# Patient Record
Sex: FEMALE | Race: WHITE | Employment: UNEMPLOYED | URBAN - METROPOLITAN AREA
[De-identification: names, ages, dates, MRNs, and addresses within clinical notes are randomized per-mention and may not be internally consistent; named-entity substitution may affect disease eponyms.]

---

## 2017-01-18 ENCOUNTER — OFFICE VISIT (OUTPATIENT)
Dept: ORTHOPEDIC SURGERY | Age: 57
End: 2017-01-18

## 2017-01-18 VITALS
HEART RATE: 98 BPM | DIASTOLIC BLOOD PRESSURE: 88 MMHG | BODY MASS INDEX: 25.66 KG/M2 | SYSTOLIC BLOOD PRESSURE: 135 MMHG | WEIGHT: 154 LBS | HEIGHT: 65 IN

## 2017-01-18 DIAGNOSIS — M75.81 ROTATOR CUFF TENDINITIS, RIGHT: ICD-10-CM

## 2017-01-18 DIAGNOSIS — M54.2 NECK PAIN: Primary | ICD-10-CM

## 2017-01-18 DIAGNOSIS — S16.1XXA CERVICAL STRAIN, INITIAL ENCOUNTER: ICD-10-CM

## 2017-01-18 DIAGNOSIS — S46.911A SHOULDER STRAIN, RIGHT, INITIAL ENCOUNTER: ICD-10-CM

## 2017-01-18 PROCEDURE — 99243 OFF/OP CNSLTJ NEW/EST LOW 30: CPT | Performed by: PHYSICAL MEDICINE & REHABILITATION

## 2017-01-18 PROCEDURE — 72050 X-RAY EXAM NECK SPINE 4/5VWS: CPT | Performed by: PHYSICAL MEDICINE & REHABILITATION

## 2017-01-25 ENCOUNTER — TELEPHONE (OUTPATIENT)
Dept: ORTHOPEDIC SURGERY | Age: 57
End: 2017-01-25

## 2017-01-31 ENCOUNTER — OFFICE VISIT (OUTPATIENT)
Dept: ORTHOPEDIC SURGERY | Age: 57
End: 2017-01-31

## 2017-01-31 DIAGNOSIS — S46.911D STRAIN OF RIGHT SHOULDER, SUBSEQUENT ENCOUNTER: Primary | ICD-10-CM

## 2017-01-31 PROCEDURE — 95886 MUSC TEST DONE W/N TEST COMP: CPT | Performed by: PHYSICAL MEDICINE & REHABILITATION

## 2017-01-31 PROCEDURE — 95908 NRV CNDJ TST 3-4 STUDIES: CPT | Performed by: PHYSICAL MEDICINE & REHABILITATION

## 2017-02-24 ENCOUNTER — TELEPHONE (OUTPATIENT)
Dept: ORTHOPEDIC SURGERY | Age: 57
End: 2017-02-24

## 2017-03-01 ENCOUNTER — OFFICE VISIT (OUTPATIENT)
Dept: ORTHOPEDIC SURGERY | Age: 57
End: 2017-03-01

## 2017-03-01 VITALS
HEIGHT: 65 IN | DIASTOLIC BLOOD PRESSURE: 86 MMHG | BODY MASS INDEX: 25.67 KG/M2 | HEART RATE: 101 BPM | SYSTOLIC BLOOD PRESSURE: 132 MMHG | WEIGHT: 154.1 LBS

## 2017-03-01 DIAGNOSIS — M75.81 ROTATOR CUFF TENDINITIS, RIGHT: ICD-10-CM

## 2017-03-01 DIAGNOSIS — S46.911A SHOULDER STRAIN, RIGHT, INITIAL ENCOUNTER: Primary | ICD-10-CM

## 2017-03-01 PROCEDURE — 99212 OFFICE O/P EST SF 10 MIN: CPT | Performed by: PHYSICAL MEDICINE & REHABILITATION

## 2017-03-14 ENCOUNTER — TELEPHONE (OUTPATIENT)
Dept: ORTHOPEDIC SURGERY | Age: 57
End: 2017-03-14

## 2017-03-15 ENCOUNTER — TELEPHONE (OUTPATIENT)
Dept: ORTHOPEDIC SURGERY | Age: 57
End: 2017-03-15

## 2017-03-30 ENCOUNTER — TELEPHONE (OUTPATIENT)
Dept: ORTHOPEDIC SURGERY | Age: 57
End: 2017-03-30

## 2017-04-04 ENCOUNTER — TELEPHONE (OUTPATIENT)
Dept: ORTHOPEDIC SURGERY | Age: 57
End: 2017-04-04

## 2017-04-05 ENCOUNTER — TELEPHONE (OUTPATIENT)
Dept: ORTHOPEDIC SURGERY | Age: 57
End: 2017-04-05

## 2022-03-09 ENCOUNTER — HOSPITAL ENCOUNTER (OUTPATIENT)
Dept: GENERAL RADIOLOGY | Age: 62
Discharge: HOME | End: 2022-03-09
Payer: COMMERCIAL

## 2022-03-09 ENCOUNTER — HOSPITAL ENCOUNTER (OUTPATIENT)
Age: 62
Discharge: HOME | End: 2022-03-09
Payer: COMMERCIAL

## 2022-03-09 DIAGNOSIS — M54.16 LUMBAR RADICULAR PAIN: ICD-10-CM

## 2022-03-09 DIAGNOSIS — M47.816 SPONDYLOSIS OF LUMBAR REGION WITHOUT MYELOPATHY OR RADICULOPATHY: ICD-10-CM

## 2022-03-09 PROCEDURE — 72070 X-RAY EXAM THORAC SPINE 2VWS: CPT

## 2022-03-09 PROCEDURE — 72110 X-RAY EXAM L-2 SPINE 4/>VWS: CPT

## 2023-06-03 ENCOUNTER — HOSPITAL ENCOUNTER (INPATIENT)
Age: 63
LOS: 3 days | Discharge: HOME | DRG: 198 | End: 2023-06-06
Attending: EMERGENCY MEDICINE | Admitting: INTERNAL MEDICINE
Payer: COMMERCIAL

## 2023-06-03 ENCOUNTER — APPOINTMENT (OUTPATIENT)
Dept: GENERAL RADIOLOGY | Age: 63
DRG: 198 | End: 2023-06-03
Payer: COMMERCIAL

## 2023-06-03 DIAGNOSIS — R06.02 SHORTNESS OF BREATH: ICD-10-CM

## 2023-06-03 DIAGNOSIS — R06.09 EXERTIONAL DYSPNEA: Primary | ICD-10-CM

## 2023-06-03 DIAGNOSIS — R07.9 CHEST PAIN, UNSPECIFIED TYPE: ICD-10-CM

## 2023-06-03 DIAGNOSIS — R06.01 ORTHOPNEA: ICD-10-CM

## 2023-06-03 DIAGNOSIS — J44.9 CHRONIC OBSTRUCTIVE PULMONARY DISEASE, UNSPECIFIED COPD TYPE (HCC): ICD-10-CM

## 2023-06-03 DIAGNOSIS — R60.0 LEG EDEMA: ICD-10-CM

## 2023-06-03 PROBLEM — F17.210 CIGARETTE NICOTINE DEPENDENCE WITHOUT COMPLICATION: Status: ACTIVE | Noted: 2023-06-03

## 2023-06-03 LAB
ALBUMIN SERPL-MCNC: 4.3 G/DL (ref 3.4–5)
ALBUMIN/GLOB SERPL: 1.6 {RATIO} (ref 1.1–2.2)
ALP SERPL-CCNC: 78 U/L (ref 40–129)
ALT SERPL-CCNC: 17 U/L (ref 10–40)
ANION GAP SERPL CALCULATED.3IONS-SCNC: 13 MMOL/L (ref 3–16)
AST SERPL-CCNC: 20 U/L (ref 15–37)
BASE EXCESS BLDV CALC-SCNC: 0.1 MMOL/L (ref -3–3)
BASOPHILS # BLD: 0.1 K/UL (ref 0–0.2)
BASOPHILS NFR BLD: 0.6 %
BILIRUB SERPL-MCNC: <0.2 MG/DL (ref 0–1)
BILIRUB UR QL STRIP.AUTO: NEGATIVE
BUN SERPL-MCNC: 17 MG/DL (ref 7–20)
CALCIUM SERPL-MCNC: 9.8 MG/DL (ref 8.3–10.6)
CHLORIDE SERPL-SCNC: 101 MMOL/L (ref 99–110)
CLARITY UR: CLEAR
CO2 BLDV-SCNC: 28 MMOL/L
CO2 SERPL-SCNC: 26 MMOL/L (ref 21–32)
COHGB MFR BLDV: 4.6 % (ref 0–1.5)
COLOR UR: YELLOW
CREAT SERPL-MCNC: 0.6 MG/DL (ref 0.6–1.2)
D DIMER: 0.36 UG/ML FEU (ref 0–0.6)
DEPRECATED RDW RBC AUTO: 13.6 % (ref 12.4–15.4)
EKG ATRIAL RATE: 103 BPM
EKG DIAGNOSIS: NORMAL
EKG P AXIS: 86 DEGREES
EKG P-R INTERVAL: 116 MS
EKG Q-T INTERVAL: 354 MS
EKG QRS DURATION: 82 MS
EKG QTC CALCULATION (BAZETT): 463 MS
EKG R AXIS: 80 DEGREES
EKG T AXIS: 66 DEGREES
EKG VENTRICULAR RATE: 103 BPM
EOSINOPHIL # BLD: 0.1 K/UL (ref 0–0.6)
EOSINOPHIL NFR BLD: 0.8 %
EPI CELLS #/AREA URNS HPF: NORMAL /HPF (ref 0–5)
GFR SERPLBLD CREATININE-BSD FMLA CKD-EPI: >60 ML/MIN/{1.73_M2}
GLUCOSE SERPL-MCNC: 114 MG/DL (ref 70–99)
GLUCOSE UR STRIP.AUTO-MCNC: NEGATIVE MG/DL
HCO3 BLDV-SCNC: 26.4 MMOL/L (ref 23–29)
HCT VFR BLD AUTO: 40.1 % (ref 36–48)
HGB BLD-MCNC: 13.5 G/DL (ref 12–16)
HGB UR QL STRIP.AUTO: ABNORMAL
KETONES UR STRIP.AUTO-MCNC: NEGATIVE MG/DL
LACTATE BLDV-SCNC: 1.8 MMOL/L (ref 0.4–1.9)
LACTATE BLDV-SCNC: 2.1 MMOL/L (ref 0.4–1.9)
LEUKOCYTE ESTERASE UR QL STRIP.AUTO: ABNORMAL
LYMPHOCYTES # BLD: 2.2 K/UL (ref 1–5.1)
LYMPHOCYTES NFR BLD: 22.7 %
MCH RBC QN AUTO: 30.3 PG (ref 26–34)
MCHC RBC AUTO-ENTMCNC: 33.8 G/DL (ref 31–36)
MCV RBC AUTO: 89.8 FL (ref 80–100)
METHGB MFR BLDV: 0.3 %
MONOCYTES # BLD: 0.6 K/UL (ref 0–1.3)
MONOCYTES NFR BLD: 6.1 %
NEUTROPHILS # BLD: 6.9 K/UL (ref 1.7–7.7)
NEUTROPHILS NFR BLD: 69.8 %
NITRITE UR QL STRIP.AUTO: NEGATIVE
NT-PROBNP SERPL-MCNC: 185 PG/ML (ref 0–124)
O2 THERAPY: ABNORMAL
PCO2 BLDV: 49.2 MMHG (ref 40–50)
PH BLDV: 7.35 [PH] (ref 7.35–7.45)
PH UR STRIP.AUTO: 6 [PH] (ref 5–8)
PLATELET # BLD AUTO: 259 K/UL (ref 135–450)
PMV BLD AUTO: 8.9 FL (ref 5–10.5)
PO2 BLDV: 48.9 MMHG (ref 25–40)
POTASSIUM SERPL-SCNC: 3.9 MMOL/L (ref 3.5–5.1)
PROCALCITONIN SERPL IA-MCNC: 0.03 NG/ML (ref 0–0.15)
PROT SERPL-MCNC: 7 G/DL (ref 6.4–8.2)
PROT UR STRIP.AUTO-MCNC: NEGATIVE MG/DL
RBC # BLD AUTO: 4.46 M/UL (ref 4–5.2)
RBC #/AREA URNS HPF: NORMAL /HPF (ref 0–4)
SAO2 % BLDV: 82 %
SODIUM SERPL-SCNC: 140 MMOL/L (ref 136–145)
SP GR UR STRIP.AUTO: 1.01 (ref 1–1.03)
TROPONIN, HIGH SENSITIVITY: 18 NG/L (ref 0–14)
TROPONIN, HIGH SENSITIVITY: 19 NG/L (ref 0–14)
TROPONIN, HIGH SENSITIVITY: 19 NG/L (ref 0–14)
UA COMPLETE W REFLEX CULTURE PNL UR: ABNORMAL
UA DIPSTICK W REFLEX MICRO PNL UR: YES
URN SPEC COLLECT METH UR: ABNORMAL
UROBILINOGEN UR STRIP-ACNC: 0.2 E.U./DL
WBC # BLD AUTO: 9.9 K/UL (ref 4–11)
WBC #/AREA URNS HPF: NORMAL /HPF (ref 0–5)

## 2023-06-03 PROCEDURE — 84145 PROCALCITONIN (PCT): CPT

## 2023-06-03 PROCEDURE — 96374 THER/PROPH/DIAG INJ IV PUSH: CPT

## 2023-06-03 PROCEDURE — 99285 EMERGENCY DEPT VISIT HI MDM: CPT

## 2023-06-03 PROCEDURE — 99223 1ST HOSP IP/OBS HIGH 75: CPT | Performed by: INTERNAL MEDICINE

## 2023-06-03 PROCEDURE — 2500000003 HC RX 250 WO HCPCS: Performed by: NURSE PRACTITIONER

## 2023-06-03 PROCEDURE — 85379 FIBRIN DEGRADATION QUANT: CPT

## 2023-06-03 PROCEDURE — 83880 ASSAY OF NATRIURETIC PEPTIDE: CPT

## 2023-06-03 PROCEDURE — 6360000002 HC RX W HCPCS: Performed by: EMERGENCY MEDICINE

## 2023-06-03 PROCEDURE — 93005 ELECTROCARDIOGRAM TRACING: CPT | Performed by: NURSE PRACTITIONER

## 2023-06-03 PROCEDURE — 93010 ELECTROCARDIOGRAM REPORT: CPT | Performed by: INTERNAL MEDICINE

## 2023-06-03 PROCEDURE — 6370000000 HC RX 637 (ALT 250 FOR IP): Performed by: EMERGENCY MEDICINE

## 2023-06-03 PROCEDURE — 85025 COMPLETE CBC W/AUTO DIFF WBC: CPT

## 2023-06-03 PROCEDURE — A4216 STERILE WATER/SALINE, 10 ML: HCPCS | Performed by: NURSE PRACTITIONER

## 2023-06-03 PROCEDURE — 87040 BLOOD CULTURE FOR BACTERIA: CPT

## 2023-06-03 PROCEDURE — 2580000003 HC RX 258: Performed by: INTERNAL MEDICINE

## 2023-06-03 PROCEDURE — 82803 BLOOD GASES ANY COMBINATION: CPT

## 2023-06-03 PROCEDURE — 71046 X-RAY EXAM CHEST 2 VIEWS: CPT

## 2023-06-03 PROCEDURE — 2060000000 HC ICU INTERMEDIATE R&B

## 2023-06-03 PROCEDURE — 6370000000 HC RX 637 (ALT 250 FOR IP): Performed by: NURSE PRACTITIONER

## 2023-06-03 PROCEDURE — 36415 COLL VENOUS BLD VENIPUNCTURE: CPT

## 2023-06-03 PROCEDURE — 81001 URINALYSIS AUTO W/SCOPE: CPT

## 2023-06-03 PROCEDURE — 6370000000 HC RX 637 (ALT 250 FOR IP): Performed by: INTERNAL MEDICINE

## 2023-06-03 PROCEDURE — 83605 ASSAY OF LACTIC ACID: CPT

## 2023-06-03 PROCEDURE — 2580000003 HC RX 258: Performed by: NURSE PRACTITIONER

## 2023-06-03 PROCEDURE — 80053 COMPREHEN METABOLIC PANEL: CPT

## 2023-06-03 PROCEDURE — 84484 ASSAY OF TROPONIN QUANT: CPT

## 2023-06-03 RX ORDER — SODIUM CHLORIDE 9 MG/ML
INJECTION, SOLUTION INTRAVENOUS PRN
Status: DISCONTINUED | OUTPATIENT
Start: 2023-06-03 | End: 2023-06-06 | Stop reason: HOSPADM

## 2023-06-03 RX ORDER — ASPIRIN 81 MG/1
81 TABLET, CHEWABLE ORAL DAILY
Status: DISCONTINUED | OUTPATIENT
Start: 2023-06-04 | End: 2023-06-06 | Stop reason: HOSPADM

## 2023-06-03 RX ORDER — ENOXAPARIN SODIUM 100 MG/ML
40 INJECTION SUBCUTANEOUS DAILY
Status: DISCONTINUED | OUTPATIENT
Start: 2023-06-03 | End: 2023-06-06 | Stop reason: HOSPADM

## 2023-06-03 RX ORDER — PREGABALIN 100 MG/1
100 CAPSULE ORAL 2 TIMES DAILY
Status: DISCONTINUED | OUTPATIENT
Start: 2023-06-03 | End: 2023-06-06 | Stop reason: HOSPADM

## 2023-06-03 RX ORDER — ACETAMINOPHEN 650 MG/1
650 SUPPOSITORY RECTAL EVERY 6 HOURS PRN
Status: DISCONTINUED | OUTPATIENT
Start: 2023-06-03 | End: 2023-06-06 | Stop reason: HOSPADM

## 2023-06-03 RX ORDER — NICOTINE 21 MG/24HR
1 PATCH, TRANSDERMAL 24 HOURS TRANSDERMAL DAILY
Status: DISCONTINUED | OUTPATIENT
Start: 2023-06-03 | End: 2023-06-06 | Stop reason: HOSPADM

## 2023-06-03 RX ORDER — SODIUM CHLORIDE 0.9 % (FLUSH) 0.9 %
5-40 SYRINGE (ML) INJECTION PRN
Status: DISCONTINUED | OUTPATIENT
Start: 2023-06-03 | End: 2023-06-06 | Stop reason: HOSPADM

## 2023-06-03 RX ORDER — ASPIRIN 325 MG
325 TABLET ORAL ONCE
Status: COMPLETED | OUTPATIENT
Start: 2023-06-03 | End: 2023-06-03

## 2023-06-03 RX ORDER — HYDROCODONE BITARTRATE AND ACETAMINOPHEN 5; 325 MG/1; MG/1
1 TABLET ORAL EVERY 6 HOURS PRN
Status: DISCONTINUED | OUTPATIENT
Start: 2023-06-03 | End: 2023-06-06 | Stop reason: HOSPADM

## 2023-06-03 RX ORDER — 0.9 % SODIUM CHLORIDE 0.9 %
30 INTRAVENOUS SOLUTION INTRAVENOUS ONCE
Status: COMPLETED | OUTPATIENT
Start: 2023-06-03 | End: 2023-06-03

## 2023-06-03 RX ORDER — ONDANSETRON 2 MG/ML
4 INJECTION INTRAMUSCULAR; INTRAVENOUS EVERY 6 HOURS PRN
Status: DISCONTINUED | OUTPATIENT
Start: 2023-06-03 | End: 2023-06-06 | Stop reason: HOSPADM

## 2023-06-03 RX ORDER — PREGABALIN 100 MG/1
100 CAPSULE ORAL 2 TIMES DAILY
COMMUNITY

## 2023-06-03 RX ORDER — ONDANSETRON 4 MG/1
4 TABLET, ORALLY DISINTEGRATING ORAL EVERY 8 HOURS PRN
Status: DISCONTINUED | OUTPATIENT
Start: 2023-06-03 | End: 2023-06-06 | Stop reason: HOSPADM

## 2023-06-03 RX ORDER — POLYETHYLENE GLYCOL 3350 17 G/17G
17 POWDER, FOR SOLUTION ORAL DAILY PRN
Status: DISCONTINUED | OUTPATIENT
Start: 2023-06-03 | End: 2023-06-06 | Stop reason: HOSPADM

## 2023-06-03 RX ORDER — FUROSEMIDE 10 MG/ML
20 INJECTION INTRAMUSCULAR; INTRAVENOUS ONCE
Status: COMPLETED | OUTPATIENT
Start: 2023-06-03 | End: 2023-06-03

## 2023-06-03 RX ORDER — ATORVASTATIN CALCIUM 40 MG/1
40 TABLET, FILM COATED ORAL NIGHTLY
Status: DISCONTINUED | OUTPATIENT
Start: 2023-06-03 | End: 2023-06-06 | Stop reason: HOSPADM

## 2023-06-03 RX ORDER — IPRATROPIUM BROMIDE AND ALBUTEROL SULFATE 2.5; .5 MG/3ML; MG/3ML
1 SOLUTION RESPIRATORY (INHALATION) ONCE
Status: COMPLETED | OUTPATIENT
Start: 2023-06-03 | End: 2023-06-03

## 2023-06-03 RX ORDER — SODIUM CHLORIDE 0.9 % (FLUSH) 0.9 %
5-40 SYRINGE (ML) INJECTION EVERY 12 HOURS SCHEDULED
Status: DISCONTINUED | OUTPATIENT
Start: 2023-06-03 | End: 2023-06-06 | Stop reason: HOSPADM

## 2023-06-03 RX ORDER — ACETAMINOPHEN 325 MG/1
650 TABLET ORAL EVERY 6 HOURS PRN
Status: DISCONTINUED | OUTPATIENT
Start: 2023-06-03 | End: 2023-06-06 | Stop reason: HOSPADM

## 2023-06-03 RX ADMIN — Medication 10 ML: at 20:47

## 2023-06-03 RX ADMIN — ASPIRIN 325 MG: 325 TABLET ORAL at 16:04

## 2023-06-03 RX ADMIN — LIDOCAINE HYDROCHLORIDE: 20 SOLUTION ORAL; TOPICAL at 16:03

## 2023-06-03 RX ADMIN — FAMOTIDINE 20 MG: 10 INJECTION, SOLUTION INTRAVENOUS at 16:06

## 2023-06-03 RX ADMIN — ATORVASTATIN CALCIUM 40 MG: 40 TABLET, FILM COATED ORAL at 20:45

## 2023-06-03 RX ADMIN — FUROSEMIDE 20 MG: 10 INJECTION, SOLUTION INTRAMUSCULAR; INTRAVENOUS at 17:08

## 2023-06-03 RX ADMIN — PREGABALIN 100 MG: 100 CAPSULE ORAL at 20:45

## 2023-06-03 RX ADMIN — SODIUM CHLORIDE 1710 ML: 9 INJECTION, SOLUTION INTRAVENOUS at 16:05

## 2023-06-03 RX ADMIN — HYDROCODONE BITARTRATE AND ACETAMINOPHEN 1 TABLET: 5; 325 TABLET ORAL at 20:45

## 2023-06-03 RX ADMIN — IPRATROPIUM BROMIDE AND ALBUTEROL SULFATE 1 DOSE: .5; 2.5 SOLUTION RESPIRATORY (INHALATION) at 16:07

## 2023-06-03 ASSESSMENT — LIFESTYLE VARIABLES
HOW MANY STANDARD DRINKS CONTAINING ALCOHOL DO YOU HAVE ON A TYPICAL DAY: PATIENT DOES NOT DRINK
HOW OFTEN DO YOU HAVE A DRINK CONTAINING ALCOHOL: NEVER
HOW MANY STANDARD DRINKS CONTAINING ALCOHOL DO YOU HAVE ON A TYPICAL DAY: PATIENT DOES NOT DRINK
HOW OFTEN DO YOU HAVE A DRINK CONTAINING ALCOHOL: NEVER

## 2023-06-03 ASSESSMENT — PAIN DESCRIPTION - PAIN TYPE: TYPE: CHRONIC PAIN

## 2023-06-03 ASSESSMENT — HEART SCORE: ECG: 1

## 2023-06-03 ASSESSMENT — PAIN - FUNCTIONAL ASSESSMENT
PAIN_FUNCTIONAL_ASSESSMENT: ACTIVITIES ARE NOT PREVENTED
PAIN_FUNCTIONAL_ASSESSMENT: NONE - DENIES PAIN

## 2023-06-03 ASSESSMENT — ENCOUNTER SYMPTOMS
SHORTNESS OF BREATH: 1
ABDOMINAL PAIN: 0
COLOR CHANGE: 0
FACIAL SWELLING: 0
RHINORRHEA: 0
SORE THROAT: 0

## 2023-06-03 ASSESSMENT — PAIN DESCRIPTION - DESCRIPTORS: DESCRIPTORS: ACHING

## 2023-06-03 ASSESSMENT — PAIN DESCRIPTION - LOCATION: LOCATION: BACK

## 2023-06-03 ASSESSMENT — PAIN SCALES - GENERAL: PAINLEVEL_OUTOF10: 8

## 2023-06-03 ASSESSMENT — PAIN DESCRIPTION - ORIENTATION: ORIENTATION: LOWER

## 2023-06-04 PROBLEM — J96.01 ACUTE RESPIRATORY FAILURE WITH HYPOXIA (HCC): Status: ACTIVE | Noted: 2023-06-04

## 2023-06-04 LAB
ANION GAP SERPL CALCULATED.3IONS-SCNC: 9 MMOL/L (ref 3–16)
BUN SERPL-MCNC: 14 MG/DL (ref 7–20)
CALCIUM SERPL-MCNC: 9.6 MG/DL (ref 8.3–10.6)
CHLORIDE SERPL-SCNC: 102 MMOL/L (ref 99–110)
CHOLEST SERPL-MCNC: 178 MG/DL (ref 0–199)
CO2 SERPL-SCNC: 31 MMOL/L (ref 21–32)
CREAT SERPL-MCNC: 0.7 MG/DL (ref 0.6–1.2)
GFR SERPLBLD CREATININE-BSD FMLA CKD-EPI: >60 ML/MIN/{1.73_M2}
GLUCOSE SERPL-MCNC: 90 MG/DL (ref 70–99)
HDLC SERPL-MCNC: 54 MG/DL (ref 40–60)
LDLC SERPL CALC-MCNC: 109 MG/DL
POTASSIUM SERPL-SCNC: 4.4 MMOL/L (ref 3.5–5.1)
SARS-COV-2 RDRP RESP QL NAA+PROBE: NOT DETECTED
SODIUM SERPL-SCNC: 142 MMOL/L (ref 136–145)
TRIGL SERPL-MCNC: 74 MG/DL (ref 0–150)
TROPONIN, HIGH SENSITIVITY: 21 NG/L (ref 0–14)
VLDLC SERPL CALC-MCNC: 15 MG/DL

## 2023-06-04 PROCEDURE — 94640 AIRWAY INHALATION TREATMENT: CPT

## 2023-06-04 PROCEDURE — 94669 MECHANICAL CHEST WALL OSCILL: CPT

## 2023-06-04 PROCEDURE — 6360000002 HC RX W HCPCS: Performed by: INTERNAL MEDICINE

## 2023-06-04 PROCEDURE — 80048 BASIC METABOLIC PNL TOTAL CA: CPT

## 2023-06-04 PROCEDURE — 2580000003 HC RX 258: Performed by: INTERNAL MEDICINE

## 2023-06-04 PROCEDURE — 94761 N-INVAS EAR/PLS OXIMETRY MLT: CPT

## 2023-06-04 PROCEDURE — 6370000000 HC RX 637 (ALT 250 FOR IP): Performed by: INTERNAL MEDICINE

## 2023-06-04 PROCEDURE — 84484 ASSAY OF TROPONIN QUANT: CPT

## 2023-06-04 PROCEDURE — 99233 SBSQ HOSP IP/OBS HIGH 50: CPT | Performed by: INTERNAL MEDICINE

## 2023-06-04 PROCEDURE — 36415 COLL VENOUS BLD VENIPUNCTURE: CPT

## 2023-06-04 PROCEDURE — 80061 LIPID PANEL: CPT

## 2023-06-04 PROCEDURE — 87635 SARS-COV-2 COVID-19 AMP PRB: CPT

## 2023-06-04 PROCEDURE — 99222 1ST HOSP IP/OBS MODERATE 55: CPT | Performed by: INTERNAL MEDICINE

## 2023-06-04 PROCEDURE — 2060000000 HC ICU INTERMEDIATE R&B

## 2023-06-04 RX ORDER — AMITRIPTYLINE HYDROCHLORIDE 25 MG/1
25 TABLET, FILM COATED ORAL NIGHTLY
COMMUNITY

## 2023-06-04 RX ORDER — AMITRIPTYLINE HYDROCHLORIDE 25 MG/1
25 TABLET, FILM COATED ORAL NIGHTLY
Status: DISCONTINUED | OUTPATIENT
Start: 2023-06-04 | End: 2023-06-06 | Stop reason: HOSPADM

## 2023-06-04 RX ORDER — REGADENOSON 0.08 MG/ML
0.4 INJECTION, SOLUTION INTRAVENOUS
Status: COMPLETED | OUTPATIENT
Start: 2023-06-04 | End: 2023-06-05

## 2023-06-04 RX ORDER — ALBUTEROL SULFATE 90 UG/1
2 AEROSOL, METERED RESPIRATORY (INHALATION) EVERY 6 HOURS PRN
Status: DISCONTINUED | OUTPATIENT
Start: 2023-06-04 | End: 2023-06-06 | Stop reason: HOSPADM

## 2023-06-04 RX ORDER — FUROSEMIDE 10 MG/ML
40 INJECTION INTRAMUSCULAR; INTRAVENOUS DAILY
Status: DISCONTINUED | OUTPATIENT
Start: 2023-06-04 | End: 2023-06-04

## 2023-06-04 RX ORDER — IPRATROPIUM BROMIDE AND ALBUTEROL SULFATE 2.5; .5 MG/3ML; MG/3ML
1 SOLUTION RESPIRATORY (INHALATION) 4 TIMES DAILY
Status: DISCONTINUED | OUTPATIENT
Start: 2023-06-04 | End: 2023-06-06 | Stop reason: HOSPADM

## 2023-06-04 RX ORDER — IPRATROPIUM BROMIDE AND ALBUTEROL SULFATE 2.5; .5 MG/3ML; MG/3ML
1 SOLUTION RESPIRATORY (INHALATION)
Status: DISCONTINUED | OUTPATIENT
Start: 2023-06-04 | End: 2023-06-04

## 2023-06-04 RX ADMIN — PREGABALIN 100 MG: 100 CAPSULE ORAL at 21:23

## 2023-06-04 RX ADMIN — HYDROCODONE BITARTRATE AND ACETAMINOPHEN 1 TABLET: 5; 325 TABLET ORAL at 14:57

## 2023-06-04 RX ADMIN — HYDROCODONE BITARTRATE AND ACETAMINOPHEN 1 TABLET: 5; 325 TABLET ORAL at 21:23

## 2023-06-04 RX ADMIN — IPRATROPIUM BROMIDE AND ALBUTEROL SULFATE 1 DOSE: .5; 2.5 SOLUTION RESPIRATORY (INHALATION) at 19:56

## 2023-06-04 RX ADMIN — IPRATROPIUM BROMIDE AND ALBUTEROL SULFATE 1 DOSE: .5; 2.5 SOLUTION RESPIRATORY (INHALATION) at 15:19

## 2023-06-04 RX ADMIN — AMITRIPTYLINE HYDROCHLORIDE 25 MG: 25 TABLET, FILM COATED ORAL at 21:23

## 2023-06-04 RX ADMIN — ENOXAPARIN SODIUM 40 MG: 100 INJECTION SUBCUTANEOUS at 09:19

## 2023-06-04 RX ADMIN — FUROSEMIDE 40 MG: 10 INJECTION, SOLUTION INTRAMUSCULAR; INTRAVENOUS at 09:28

## 2023-06-04 RX ADMIN — ATORVASTATIN CALCIUM 40 MG: 40 TABLET, FILM COATED ORAL at 21:23

## 2023-06-04 RX ADMIN — ASPIRIN 81 MG: 81 TABLET, CHEWABLE ORAL at 09:19

## 2023-06-04 RX ADMIN — IPRATROPIUM BROMIDE AND ALBUTEROL SULFATE 1 DOSE: .5; 2.5 SOLUTION RESPIRATORY (INHALATION) at 11:24

## 2023-06-04 RX ADMIN — PREGABALIN 100 MG: 100 CAPSULE ORAL at 09:19

## 2023-06-04 RX ADMIN — Medication 10 ML: at 21:24

## 2023-06-04 RX ADMIN — HYDROCODONE BITARTRATE AND ACETAMINOPHEN 1 TABLET: 5; 325 TABLET ORAL at 03:10

## 2023-06-04 RX ADMIN — Medication 10 ML: at 09:20

## 2023-06-04 RX ADMIN — HYDROCODONE BITARTRATE AND ACETAMINOPHEN 1 TABLET: 5; 325 TABLET ORAL at 09:19

## 2023-06-04 ASSESSMENT — PAIN SCALES - GENERAL
PAINLEVEL_OUTOF10: 2
PAINLEVEL_OUTOF10: 8
PAINLEVEL_OUTOF10: 7
PAINLEVEL_OUTOF10: 9
PAINLEVEL_OUTOF10: 7
PAINLEVEL_OUTOF10: 4

## 2023-06-04 ASSESSMENT — PAIN DESCRIPTION - DESCRIPTORS
DESCRIPTORS: ACHING;SHARP;STABBING
DESCRIPTORS: SHARP;ACHING;STABBING
DESCRIPTORS: ACHING;DISCOMFORT
DESCRIPTORS: ACHING

## 2023-06-04 ASSESSMENT — PAIN DESCRIPTION - ORIENTATION
ORIENTATION: LOWER
ORIENTATION: MID;LOWER

## 2023-06-04 ASSESSMENT — PAIN - FUNCTIONAL ASSESSMENT: PAIN_FUNCTIONAL_ASSESSMENT: ACTIVITIES ARE NOT PREVENTED

## 2023-06-04 ASSESSMENT — PAIN DESCRIPTION - LOCATION
LOCATION: BACK

## 2023-06-05 ENCOUNTER — APPOINTMENT (OUTPATIENT)
Dept: NUCLEAR MEDICINE | Age: 63
DRG: 198 | End: 2023-06-05
Payer: COMMERCIAL

## 2023-06-05 LAB
ANION GAP SERPL CALCULATED.3IONS-SCNC: 7 MMOL/L (ref 3–16)
BUN SERPL-MCNC: 15 MG/DL (ref 7–20)
CALCIUM SERPL-MCNC: 9.5 MG/DL (ref 8.3–10.6)
CHLORIDE SERPL-SCNC: 98 MMOL/L (ref 99–110)
CO2 SERPL-SCNC: 32 MMOL/L (ref 21–32)
CREAT SERPL-MCNC: 0.7 MG/DL (ref 0.6–1.2)
GFR SERPLBLD CREATININE-BSD FMLA CKD-EPI: >60 ML/MIN/{1.73_M2}
GLUCOSE SERPL-MCNC: 101 MG/DL (ref 70–99)
LV EF: 55 %
LV EF: 62 %
LVEF MODALITY: NORMAL
LVEF MODALITY: NORMAL
POTASSIUM SERPL-SCNC: 4.5 MMOL/L (ref 3.5–5.1)
SODIUM SERPL-SCNC: 137 MMOL/L (ref 136–145)

## 2023-06-05 PROCEDURE — 93306 TTE W/DOPPLER COMPLETE: CPT

## 2023-06-05 PROCEDURE — 6370000000 HC RX 637 (ALT 250 FOR IP): Performed by: INTERNAL MEDICINE

## 2023-06-05 PROCEDURE — 94669 MECHANICAL CHEST WALL OSCILL: CPT

## 2023-06-05 PROCEDURE — A9502 TC99M TETROFOSMIN: HCPCS | Performed by: INTERNAL MEDICINE

## 2023-06-05 PROCEDURE — 80048 BASIC METABOLIC PNL TOTAL CA: CPT

## 2023-06-05 PROCEDURE — 36415 COLL VENOUS BLD VENIPUNCTURE: CPT

## 2023-06-05 PROCEDURE — 6360000002 HC RX W HCPCS: Performed by: INTERNAL MEDICINE

## 2023-06-05 PROCEDURE — 2060000000 HC ICU INTERMEDIATE R&B

## 2023-06-05 PROCEDURE — 94640 AIRWAY INHALATION TREATMENT: CPT

## 2023-06-05 PROCEDURE — 94761 N-INVAS EAR/PLS OXIMETRY MLT: CPT

## 2023-06-05 PROCEDURE — 78452 HT MUSCLE IMAGE SPECT MULT: CPT

## 2023-06-05 PROCEDURE — 3430000000 HC RX DIAGNOSTIC RADIOPHARMACEUTICAL: Performed by: INTERNAL MEDICINE

## 2023-06-05 PROCEDURE — 99232 SBSQ HOSP IP/OBS MODERATE 35: CPT | Performed by: INTERNAL MEDICINE

## 2023-06-05 PROCEDURE — 2580000003 HC RX 258: Performed by: INTERNAL MEDICINE

## 2023-06-05 PROCEDURE — 99232 SBSQ HOSP IP/OBS MODERATE 35: CPT | Performed by: NURSE PRACTITIONER

## 2023-06-05 RX ADMIN — TETROFOSMIN 32 MILLICURIE: 1.38 INJECTION, POWDER, LYOPHILIZED, FOR SOLUTION INTRAVENOUS at 12:44

## 2023-06-05 RX ADMIN — HYDROCODONE BITARTRATE AND ACETAMINOPHEN 1 TABLET: 5; 325 TABLET ORAL at 05:10

## 2023-06-05 RX ADMIN — HYDROCODONE BITARTRATE AND ACETAMINOPHEN 1 TABLET: 5; 325 TABLET ORAL at 11:08

## 2023-06-05 RX ADMIN — PREGABALIN 100 MG: 100 CAPSULE ORAL at 09:01

## 2023-06-05 RX ADMIN — Medication 10 ML: at 20:20

## 2023-06-05 RX ADMIN — ATORVASTATIN CALCIUM 40 MG: 40 TABLET, FILM COATED ORAL at 20:20

## 2023-06-05 RX ADMIN — REGADENOSON 0.4 MG: 0.08 INJECTION, SOLUTION INTRAVENOUS at 12:45

## 2023-06-05 RX ADMIN — TETROFOSMIN 10.9 MILLICURIE: 1.38 INJECTION, POWDER, LYOPHILIZED, FOR SOLUTION INTRAVENOUS at 11:20

## 2023-06-05 RX ADMIN — IPRATROPIUM BROMIDE AND ALBUTEROL SULFATE 1 DOSE: .5; 2.5 SOLUTION RESPIRATORY (INHALATION) at 07:41

## 2023-06-05 RX ADMIN — IPRATROPIUM BROMIDE AND ALBUTEROL SULFATE 1 DOSE: .5; 2.5 SOLUTION RESPIRATORY (INHALATION) at 20:26

## 2023-06-05 RX ADMIN — AMITRIPTYLINE HYDROCHLORIDE 25 MG: 25 TABLET, FILM COATED ORAL at 20:20

## 2023-06-05 RX ADMIN — Medication 10 ML: at 16:27

## 2023-06-05 RX ADMIN — IPRATROPIUM BROMIDE AND ALBUTEROL SULFATE 1 DOSE: .5; 2.5 SOLUTION RESPIRATORY (INHALATION) at 15:37

## 2023-06-05 RX ADMIN — POLYETHYLENE GLYCOL 3350 17 G: 17 POWDER, FOR SOLUTION ORAL at 20:36

## 2023-06-05 RX ADMIN — ACETAMINOPHEN 650 MG: 325 TABLET ORAL at 20:22

## 2023-06-05 RX ADMIN — PREGABALIN 100 MG: 100 CAPSULE ORAL at 20:20

## 2023-06-05 RX ADMIN — HYDROCODONE BITARTRATE AND ACETAMINOPHEN 1 TABLET: 5; 325 TABLET ORAL at 23:30

## 2023-06-05 RX ADMIN — ENOXAPARIN SODIUM 40 MG: 100 INJECTION SUBCUTANEOUS at 16:26

## 2023-06-05 RX ADMIN — HYDROCODONE BITARTRATE AND ACETAMINOPHEN 1 TABLET: 5; 325 TABLET ORAL at 17:17

## 2023-06-05 RX ADMIN — ASPIRIN 81 MG: 81 TABLET, CHEWABLE ORAL at 16:26

## 2023-06-05 ASSESSMENT — PAIN DESCRIPTION - LOCATION
LOCATION: BACK
LOCATION: ANKLE;ARM

## 2023-06-05 ASSESSMENT — PAIN DESCRIPTION - ORIENTATION
ORIENTATION: MID
ORIENTATION: LOWER;MID
ORIENTATION: RIGHT
ORIENTATION: MID;LOWER
ORIENTATION: MID

## 2023-06-05 ASSESSMENT — PAIN SCALES - GENERAL
PAINLEVEL_OUTOF10: 8
PAINLEVEL_OUTOF10: 8
PAINLEVEL_OUTOF10: 7
PAINLEVEL_OUTOF10: 8

## 2023-06-05 ASSESSMENT — PAIN DESCRIPTION - DESCRIPTORS
DESCRIPTORS: ACHING
DESCRIPTORS: ACHING
DESCRIPTORS: ACHING;DISCOMFORT

## 2023-06-05 ASSESSMENT — ENCOUNTER SYMPTOMS
GASTROINTESTINAL NEGATIVE: 1
SHORTNESS OF BREATH: 1

## 2023-06-05 ASSESSMENT — PAIN - FUNCTIONAL ASSESSMENT: PAIN_FUNCTIONAL_ASSESSMENT: ACTIVITIES ARE NOT PREVENTED

## 2023-06-05 NOTE — FLOWSHEET NOTE
06/04/23 2120   Assessment   Charting Type Shift assessment   Psychosocial   Psychosocial (WDL) WDL   Neurological   Neuro (WDL) WDL   Level of Consciousness 0   Orientation Level Oriented X4   Cognition Appropriate judgement; Appropriate safety awareness; Follows commands   Speech Clear   Elsy Coma Scale   Eye Opening 4   Best Verbal Response 5   Best Motor Response 6   Elsy Coma Scale Score 15   HEENT (Head, Ears, Eyes, Nose, & Throat)   HEENT (WDL) X   Right Eye Glasses   Left Eye Glasses   Respiratory   Respiratory (WDL) X   Respiratory Pattern Regular   Respiratory Depth Normal   Respiratory Quality/Effort Dyspnea with exertion   Chest Assessment Chest expansion symmetrical;Trachea midline   L Breath Sounds Diminished   R Breath Sounds Diminished   Cardiac   Cardiac (WDL) WDL   Gastrointestinal   Abdominal (WDL) WDL   Abdomen Inspection Rounded   RUQ Bowel Sounds Active   LUQ Bowel Sounds Active   RLQ Bowel Sounds Active   LLQ Bowel Sounds Active   Tenderness Soft;Nontender   Genitourinary   Genitourinary (WDL) WDL   Suprapubic Tenderness No   Dysuria (Pain/Burning w/Urination) No   Peripheral Vascular   Peripheral Vascular (WDL) X   Edema Right lower extremity; Left lower extremity   RLE Edema Trace   LLE Edema Trace   Skin Integumentary    Skin Integumentary (WDL) WDL   Musculoskeletal   Musculoskeletal (WDL) WDL     Shift assessment completed. Patient awake in bed. A/Ox4. See flowsheet for vitals. Scheduled PM medications given per MAR. PRN Norco given for pain in back rated 9/10. Drink and snack provided. Patient denies any further needs at this time. Call light and bedside table within reach.

## 2023-06-05 NOTE — PLAN OF CARE
Problem: Discharge Planning  Goal: Discharge to home or other facility with appropriate resources  Outcome: Progressing  Flowsheets (Taken 6/4/2023 2090 by Leigh Alaniz, RN)  Discharge to home or other facility with appropriate resources: Identify barriers to discharge with patient and caregiver     Problem: Respiratory - Adult  Goal: Achieves optimal ventilation and oxygenation  Outcome: Progressing  Flowsheets (Taken 6/4/2023 0952 by Leigh Alaniz RN)  Achieves optimal ventilation and oxygenation: Assess for changes in respiratory status     Problem: Pain  Goal: Verbalizes/displays adequate comfort level or baseline comfort level  Outcome: Progressing     Problem: Safety - Adult  Goal: Free from fall injury  Outcome: Progressing

## 2023-06-05 NOTE — CARE COORDINATION
INTERDISCIPLINARY PLAN OF CARE CONFERENCE    Date/Time: 6/5/2023 4:00 PM  Completed by: Sabra Narayanan RN, Case Management      Patient Name:  Hoang Hunter  YOB: 1960  Admitting Diagnosis: Orthopnea [R06.01]  Shortness of breath [R06.02]  Leg edema [R60.0]  Exertional dyspnea [R06.09]  Chest pain, unspecified type [R07.9]     Admit Date/Time:  6/3/2023  1:53 PM    Chart reviewed. Interdisciplinary team contacted or reviewed plan related to patient progress and discharge plans. Disciplines included Case Management, Nursing, and Dietitian. Current Status:ip  PT/OT recommendation for discharge plan of care: na    Expected D/C Disposition:  Home  Confirmed plan with patient and/or family Yes confirmed with: (name) Caitlyn Jensen  Met with:aletha  Discharge Plan Comments: met with pt at bedside. Pt is on RA and no IV's running. Pt plans to return home and is undecided on [address]. Pt states she will speak to her spouse.  Will folloe    Home O2 in place on admit: No  Pt informed of need to bring portable home O2 tank on day of discharge for nursing to connect prior to leaving:  Not Indicated  Verbalized agreement/Understanding:  Not Indicated

## 2023-06-05 NOTE — FLOWSHEET NOTE
06/04/23 2339   Vitals   Temp 97.5 °F (36.4 °C)   Temp Source Oral   Pulse 85   Heart Rate Source Monitor   Respirations 16   BP (!) 160/85   MAP (Calculated) 110   BP Method Automatic   Patient Position Sitting   Level of Consciousness 0   MEWS Score 1   Oxygen Therapy   SpO2 98 %   O2 Device None (Room air)     Patient resting in bed with eyes closed. Respirations e/e. Call light within reach.

## 2023-06-06 ENCOUNTER — APPOINTMENT (OUTPATIENT)
Dept: CT IMAGING | Age: 63
DRG: 198 | End: 2023-06-06
Payer: COMMERCIAL

## 2023-06-06 ENCOUNTER — TELEPHONE (OUTPATIENT)
Dept: PULMONOLOGY | Age: 63
End: 2023-06-06

## 2023-06-06 VITALS
TEMPERATURE: 98.1 F | HEIGHT: 65 IN | OXYGEN SATURATION: 96 % | WEIGHT: 173.6 LBS | HEART RATE: 96 BPM | SYSTOLIC BLOOD PRESSURE: 137 MMHG | BODY MASS INDEX: 28.92 KG/M2 | RESPIRATION RATE: 16 BRPM | DIASTOLIC BLOOD PRESSURE: 86 MMHG

## 2023-06-06 PROBLEM — J47.9 BRONCHIECTASIS WITHOUT COMPLICATION (HCC): Status: ACTIVE | Noted: 2023-06-06

## 2023-06-06 PROBLEM — R93.89 ABNORMAL CT OF THE CHEST: Status: ACTIVE | Noted: 2023-06-06

## 2023-06-06 PROBLEM — Z72.0 TOBACCO ABUSE: Status: ACTIVE | Noted: 2023-06-06

## 2023-06-06 LAB
ANION GAP SERPL CALCULATED.3IONS-SCNC: 10 MMOL/L (ref 3–16)
BUN SERPL-MCNC: 14 MG/DL (ref 7–20)
CALCIUM SERPL-MCNC: 9.8 MG/DL (ref 8.3–10.6)
CHLORIDE SERPL-SCNC: 101 MMOL/L (ref 99–110)
CO2 SERPL-SCNC: 29 MMOL/L (ref 21–32)
CREAT SERPL-MCNC: 0.7 MG/DL (ref 0.6–1.2)
GFR SERPLBLD CREATININE-BSD FMLA CKD-EPI: >60 ML/MIN/{1.73_M2}
GLUCOSE SERPL-MCNC: 107 MG/DL (ref 70–99)
IGA SERPL-MCNC: 86 MG/DL (ref 70–400)
IGG SERPL-MCNC: 899 MG/DL (ref 700–1600)
IGM SERPL-MCNC: 60 MG/DL (ref 40–230)
POTASSIUM SERPL-SCNC: 4.8 MMOL/L (ref 3.5–5.1)
SODIUM SERPL-SCNC: 140 MMOL/L (ref 136–145)

## 2023-06-06 PROCEDURE — 6370000000 HC RX 637 (ALT 250 FOR IP): Performed by: INTERNAL MEDICINE

## 2023-06-06 PROCEDURE — 94640 AIRWAY INHALATION TREATMENT: CPT

## 2023-06-06 PROCEDURE — 82103 ALPHA-1-ANTITRYPSIN TOTAL: CPT

## 2023-06-06 PROCEDURE — 99232 SBSQ HOSP IP/OBS MODERATE 35: CPT | Performed by: NURSE PRACTITIONER

## 2023-06-06 PROCEDURE — 82784 ASSAY IGA/IGD/IGG/IGM EACH: CPT

## 2023-06-06 PROCEDURE — 99239 HOSP IP/OBS DSCHRG MGMT >30: CPT | Performed by: INTERNAL MEDICINE

## 2023-06-06 PROCEDURE — 71250 CT THORAX DX C-: CPT

## 2023-06-06 PROCEDURE — 93017 CV STRESS TEST TRACING ONLY: CPT

## 2023-06-06 PROCEDURE — 6360000002 HC RX W HCPCS: Performed by: INTERNAL MEDICINE

## 2023-06-06 PROCEDURE — 36415 COLL VENOUS BLD VENIPUNCTURE: CPT

## 2023-06-06 PROCEDURE — 2580000003 HC RX 258: Performed by: INTERNAL MEDICINE

## 2023-06-06 PROCEDURE — 80048 BASIC METABOLIC PNL TOTAL CA: CPT

## 2023-06-06 PROCEDURE — 99223 1ST HOSP IP/OBS HIGH 75: CPT | Performed by: INTERNAL MEDICINE

## 2023-06-06 PROCEDURE — 94761 N-INVAS EAR/PLS OXIMETRY MLT: CPT

## 2023-06-06 PROCEDURE — 94669 MECHANICAL CHEST WALL OSCILL: CPT

## 2023-06-06 RX ORDER — BUDESONIDE AND FORMOTEROL FUMARATE DIHYDRATE 160; 4.5 UG/1; UG/1
2 AEROSOL RESPIRATORY (INHALATION) 2 TIMES DAILY
Qty: 30.6 G | Refills: 1 | Status: SHIPPED | OUTPATIENT
Start: 2023-06-06

## 2023-06-06 RX ORDER — DOXYCYCLINE HYCLATE 100 MG
100 TABLET ORAL 2 TIMES DAILY
Qty: 10 TABLET | Refills: 0 | Status: SHIPPED | OUTPATIENT
Start: 2023-06-06 | End: 2023-06-11

## 2023-06-06 RX ORDER — IPRATROPIUM BROMIDE AND ALBUTEROL SULFATE 2.5; .5 MG/3ML; MG/3ML
1 SOLUTION RESPIRATORY (INHALATION) EVERY 4 HOURS
Qty: 360 ML | Refills: 2 | Status: SHIPPED | OUTPATIENT
Start: 2023-06-06

## 2023-06-06 RX ORDER — ALBUTEROL SULFATE 90 UG/1
2 AEROSOL, METERED RESPIRATORY (INHALATION) 4 TIMES DAILY PRN
Qty: 18 G | Refills: 0 | Status: SHIPPED | OUTPATIENT
Start: 2023-06-06

## 2023-06-06 RX ORDER — PREDNISONE 10 MG/1
TABLET ORAL
Qty: 30 TABLET | Refills: 0 | Status: SHIPPED | OUTPATIENT
Start: 2023-06-06

## 2023-06-06 RX ORDER — NICOTINE 21 MG/24HR
1 PATCH, TRANSDERMAL 24 HOURS TRANSDERMAL DAILY
Qty: 30 PATCH | Refills: 0 | Status: SHIPPED | OUTPATIENT
Start: 2023-06-06 | End: 2023-07-06

## 2023-06-06 RX ADMIN — HYDROCODONE BITARTRATE AND ACETAMINOPHEN 1 TABLET: 5; 325 TABLET ORAL at 11:47

## 2023-06-06 RX ADMIN — IPRATROPIUM BROMIDE AND ALBUTEROL SULFATE 1 DOSE: .5; 2.5 SOLUTION RESPIRATORY (INHALATION) at 07:42

## 2023-06-06 RX ADMIN — ENOXAPARIN SODIUM 40 MG: 100 INJECTION SUBCUTANEOUS at 08:32

## 2023-06-06 RX ADMIN — PREGABALIN 100 MG: 100 CAPSULE ORAL at 08:32

## 2023-06-06 RX ADMIN — ASPIRIN 81 MG: 81 TABLET, CHEWABLE ORAL at 08:32

## 2023-06-06 RX ADMIN — IPRATROPIUM BROMIDE AND ALBUTEROL SULFATE 1 DOSE: .5; 2.5 SOLUTION RESPIRATORY (INHALATION) at 11:30

## 2023-06-06 RX ADMIN — HYDROCODONE BITARTRATE AND ACETAMINOPHEN 1 TABLET: 5; 325 TABLET ORAL at 05:22

## 2023-06-06 RX ADMIN — Medication 10 ML: at 08:33

## 2023-06-06 ASSESSMENT — PAIN DESCRIPTION - DESCRIPTORS
DESCRIPTORS: ACHING
DESCRIPTORS: SHARP

## 2023-06-06 ASSESSMENT — PAIN DESCRIPTION - LOCATION
LOCATION: BACK
LOCATION: BACK;GENERALIZED

## 2023-06-06 ASSESSMENT — PAIN DESCRIPTION - ORIENTATION
ORIENTATION: LOWER
ORIENTATION: RIGHT;LEFT;MID;LOWER

## 2023-06-06 ASSESSMENT — PAIN SCALES - GENERAL
PAINLEVEL_OUTOF10: 3
PAINLEVEL_OUTOF10: 8

## 2023-06-06 ASSESSMENT — ENCOUNTER SYMPTOMS
GASTROINTESTINAL NEGATIVE: 1
SHORTNESS OF BREATH: 1

## 2023-06-06 NOTE — CARE COORDINATION
DISCHARGE ORDER  Date/Time 2023 12:21 PM  Completed by: Vivi Vaz RN, Case Management    Patient Name: Elsa Dhaliwal      : 1960  Admitting Diagnosis: Orthopnea [R06.01]  Shortness of breath [R06.02]  Leg edema [R60.0]  Exertional dyspnea [R06.09]  Chest pain, unspecified type [R07.9]      Admit order Date and Status:ip 6/3  (verify MD's last order for status of admission)      Noted discharge order. If applicable PT/OT recommendation at Discharge: na  DME recommendation by PT/OT:na  Confirmed discharge plan  (): Yes  with whom_______________  If pt confirmed DC plan does family need to be contacted by CM No     Discharge Plan: Reviewed chart and met with pt and spouse. They are agreeable to [facility] home care at this time. Referral made to [facility] to [facility]. Pt will DC home with spouse. No other DC or DME needs identified at this time. Nebulizer provided to pt    Date of Last IMM Given: na    Reviewed chart. Role of discharge planner explained and patient verbalized understanding. Discharge order is noted. Has Home O2 in place on admit:  No  Informed of need to bring portable home O2 tank on day of discharge for nursing to connect prior to leaving:   Not Indicated  Verbalized agreement/Understanding:   Not Indicated  Pt is being d/c'd to home today. Pt's O2 sats are 96% on ra. Discharge timeout done with rn, pt, cm. All discharge needs and concerns addressed.

## 2023-06-06 NOTE — CARE COORDINATION
[facility]  Received referral regarding HC services from [facility]. Sent request to [facility] for [facility] coverage.       Electronically signed by Gisel Magana RN on 6/6/2023 at 12:26 PM

## 2023-06-06 NOTE — FLOWSHEET NOTE
06/06/23 0823   Vital Signs   Temp 99.5 °F (37.5 °C)   Temp Source Oral   Pulse (!) 101   Heart Rate Source Monitor   Respirations 16   BP (!) 151/79   MAP (Calculated) 103   BP Location Left upper arm   Patient Position Semi fowlers   Oxygen Therapy   SpO2 92 %   O2 Device None (Room air)     Vitals are stable. Patient is resting in bed. Patient is A&O x4. Shift assessment completed. Medications given. Patient denies further needs. Call light within reach.

## 2023-06-06 NOTE — CARE COORDINATION
[facility]  Received referral regarding HC services from [facility]. Sent request to [facility] for [facility] coverage. [facility] is unable to service [facility] in timely manner. Telephone call to [place] with [facility]. [facility].. [place] has epic access and can pull HC orders. Collaborated with  to arrange HC.     Electronically signed by Triny Carr RN on 6/6/2023 at 12:36 PM

## 2023-06-06 NOTE — FLOWSHEET NOTE
06/06/23 1143   Vital Signs   Temp 98.1 °F (36.7 °C)   Temp Source Oral   Pulse 96   Heart Rate Source Monitor   Respirations 16   /86   MAP (Calculated) 103   BP Location Left upper arm   Patient Position Semi fowlers   Oxygen Therapy   SpO2 96 %   O2 Device None (Room air)     Patient resting in bed and denies further needs. Call light within reach.

## 2023-06-06 NOTE — CONSULTS
for input(s): PHART, [id number], PO2ART in the last 72 hours.     Microbiology:  6/3 BC NGTD  6/4 COVID-19 not detected    Imaging:  CT chest 6/6 imaging was reviewed by me and showed   Bronchiectasis  Scattered tree-in-bud opacities on my read    ASSESSMENT:  Dyspnea on exertion with cough and wheezes-likely COPD, RB ILD, and bronchiectasis related  Abnormal CT chest with tree-in-bud opacities-likely RB ILD  Pulmonary bronchiectasis  45-pack-year history of smoking    PLAN:  Trial of Symbicort twice daily and albuterol as needed upon discharge  Doxycycline for 5 days  Prednisone taper  Check immunoglobulin level, alpha-1 antitrypsin  PFTs and 6-minute walk as an outpatient (Office is  notified)   Smoking cessation counseling

## 2023-06-06 NOTE — DISCHARGE SUMMARY
Negative         CT CHEST WO CONTRAST   Final Result   Mild pattern of bronchiectasis with no acute pulmonary finding. NM Cardiac Stress Test Nuclear Imaging   Final Result      XR CHEST (2 VW)   Final Result   No acute cardiopulmonary process. Discharge Medications     Medication List        START taking these medications      albuterol sulfate  (90 Base) MCG/ACT inhaler  Commonly known as: Ventolin HFA  Inhale 2 puffs into the lungs 4 times daily as needed for Wheezing     budesonide-formoterol 160-4.5 MCG/ACT Aero  Commonly known as: Symbicort  Inhale 2 puffs into the lungs 2 times daily     doxycycline hyclate 100 MG tablet  Commonly known as: VIBRA-TABS  Take 1 tablet by mouth 2 times daily for 5 days     ipratropium 0.5 mg-albuterol 2.5 mg 0.5-2.5 (3) MG/3ML Soln nebulizer solution  Commonly known as: DUONEB  Inhale 3 mLs into the lungs every 4 hours     nicotine 14 MG/24HR  Commonly known as: NICODERM CQ  Place 1 patch onto the skin daily     predniSONE 10 MG tablet  Commonly known as: DELTASONE  4 tabs for 3 days 3 tabs for 3 days 2 tabs for 3 days 1 tabs for 3 days            CONTINUE taking these medications      amitriptyline 25 MG tablet  Commonly known as: ELAVIL     HYDROcodone-acetaminophen 5-325 MG per tablet  Commonly known as: Norco  Take 1 tablet by mouth every 6 hours as needed for Pain for up to 5 doses. naproxen 500 MG tablet  Commonly known as: Naprosyn  Take 1 tablet by mouth 2 times daily (with meals)     pregabalin 100 MG capsule  Commonly known as: LYRICA            STOP taking these medications      ibuprofen 200 MG tablet  Commonly known as:  Advil               Where to Get Your Medications        These medications were sent to [address] [facility], [address] [facility],[address]  [address] [facility], [address] [facility] [address]      Phone: [phone]   albuterol sulfate  (90 Base) MCG/ACT

## 2023-06-07 ENCOUNTER — CARE COORDINATION (OUTPATIENT)
Dept: CASE MANAGEMENT | Age: 63
End: 2023-06-07

## 2023-06-07 LAB
BACTERIA BLD CULT ORG #2: NORMAL
BACTERIA BLD CULT: NORMAL

## 2023-06-07 NOTE — CARE COORDINATION
[facility] Care Transitions Initial Follow Up Call    Call within 2 business days of discharge: Yes      Patient: Edward Chávez Patient : 1960   MRN: [id number]  Reason for Admission: 3 days -> BANERJEE with cough and wheezes-likely COPD, RB ILD and bronchiectasis related to atypical chest pain; HTN, anxiety, nicotine dependence-recommend cessation -> home no services, non [facility] PCP  Discharge Date: 23 RARS: Readmission Risk Score: 5      Last Discharge  [address]       Date Complaint Diagnosis Description Type Department Provider    6/3/23 Shortness of Breath; Wheezing; Chest Pain Exertional dyspnea . .. ED to Hosp-Admission (Discharged) (ADMITTED) [address] [facility]; [address] [facility]     1st attempt - CTN attempted follow-up outreach to patient. Message left including CTN contact information. Follow Up  No future appointments.     Maurice Boothe RN  Care Transition Nurse  [phone] mobile

## 2023-06-08 ENCOUNTER — CARE COORDINATION (OUTPATIENT)
Dept: CASE MANAGEMENT | Age: 63
End: 2023-06-08

## 2023-06-08 LAB — A1AT SERPL-MCNC: 153 MG/DL (ref 90–200)

## 2023-06-08 NOTE — CARE COORDINATION
[facility] Care Transitions Initial Follow Up Call    Call within 2 business days of discharge: Yes      Patient: Kaitlyn Abrams Patient : 1960   MRN: [id number]  Reason for Admission: 3 days -> BANERJEE with cough and wheezes-likely COPD, RB ILD and bronchiectasis related to atypical chest pain; HTN, anxiety, nicotine dependence-recommend cessation -> home with [facility], non [facility] PCP  Discharge Date: 23 RARS: Readmission Risk Score: 5      Last Discharge  [address]       Date Complaint Diagnosis Description Type Department Provider    6/3/23 Shortness of Breath; Wheezing; Chest Pain Exertional dyspnea . .. ED to Hosp-Admission (Discharged) (ADMITTED) [address] [facility]; [address] [facility]     2nd attempt - CTN attempted follow-up outreach to patient. Message left including CTN contact information. No further CTN outreach scheduled. CTN contacted Roselyn Victoria at [facility]. She did not have referral. She will pull demographics, etc from carelink access. Follow Up  No future appointments.     Jose Michaels RN  Care Transition Nurse  [phone] mobile

## 2023-06-09 ENCOUNTER — CARE COORDINATION (OUTPATIENT)
Dept: CASE MANAGEMENT | Age: 63
End: 2023-06-09

## 2023-06-09 NOTE — CARE COORDINATION
[facility] Care Transitions Initial Follow Up Call    Call within 2 business days of discharge: Yes      Patient: Elbert Nicholas Patient : 1960   MRN: [id number]  Reason for Admission: 3 days -> BANERJEE with cough and wheezes-likely COPD, RB ILD and bronchiectasis related to atypical chest pain; HTN, anxiety, nicotine dependence-recommend cessation -> home no services, non [facility] PCP  Discharge Date: 23 RARS: Readmission Risk Score: 5      Last Discharge  [facility]       Date Complaint Diagnosis Description Type Department Provider    6/3/23 Shortness of Breath; Wheezing; Chest Pain Exertional dyspnea . .. ED to Hosp-Admission (Discharged) (ADMITTED) [facility] [place], DO; Liana Estrella DO     CTN received call back from Mikhail at [address] [facility]. They have been unable to reach patient and cannot find a PCP on file. She contacted [facility] office, listed on chart as PCP, states the patient is not active with them. She was asking if CTN had any other insight into active PCP. CTN could not find any info on alternate PCP. Mikhail states if they are unable to find a PCP and/or unable to reach patient they will not be able to provide home care services. CTN attempted 3rd outreach to patient without success. No further outreach scheduled. Hanh Young RN  Care Transition Nurse  [phone] mobile    No future appointments.

## 2023-06-12 NOTE — PROGRESS NOTES
A stress test was completed on this patient as ordered. Pt c/o feeling flushed, sob, head ache, and abd cramping. Symptoms resolved in recovery. Pt denied cp. The patient tolerated the procedure well. Awaiting stress imaging at this time.
[address]  Cardiology  Progress Note    Admission date:  6/3/2023    Reason for follow up visit: SOB, chest pain    HPI/CC: Kimo Garcia is a 65 y.o. female who presented 6/3/2023 for chest pain and SOB. Echo showed EF 55%. Stress test showed artifact, no ischemia noted. Rhythm has been sinus. Subjective: SOB improving with breathing treatments, no chest pain today. Vitals:  Blood pressure (!) 151/79, pulse (!) 101, temperature 99.5 °F (37.5 °C), temperature source Oral, resp. rate 16, height 5' 5\" (1.651 m), weight 173 lb 9.6 oz (78.7 kg), last menstrual period 2010, SpO2 92 %, not currently breastfeeding.   Temp  Av.2 °F (36.8 °C)  Min: 97.5 °F (36.4 °C)  Max: 99.5 °F (37.5 °C)  Pulse  Av.6  Min: 88  Max: 119  BP  Min: 127/111  Max: 151/79  SpO2  Av.1 %  Min: 92 %  Max: 95 %    24 hour I/O    Intake/Output Summary (Last 24 hours) at 2023 0846  Last data filed at 2023 0166  Gross per 24 hour   Intake 420 ml   Output 2600 ml   Net -2180 ml       Current Facility-Administered Medications   Medication Dose Route Frequency Provider Last Rate Last Admin    albuterol sulfate HFA (PROVENTIL;VENTOLIN;PROAIR) 108 (90 Base) MCG/ACT inhaler 2 puff  2 puff Inhalation Q6H PRN Fabian Denis MD        ipratropium 0.5 mg-albuterol 2.5 mg (DUONEB) nebulizer solution 1 Dose  1 Dose Inhalation 4x daily Fabian Denis MD   1 Dose at 23 0742    amitriptyline (ELAVIL) tablet 25 mg  25 mg Oral Nightly Callie Black, DO   25 mg at 23    nicotine (NICODERM CQ) 21 MG/24HR 1 patch  1 patch TransDERmal Daily Callie Black, DO   1 patch at 23 4086    sodium chloride flush 0.9 % injection 5-40 mL  5-40 mL IntraVENous 2 times per day Callie Black, DO   10 mL at 23 1041    sodium chloride flush 0.9 % injection 5-40 mL  5-40 mL IntraVENous PRN Callie Black, DO        0.9 % sodium chloride infusion   IntraVENous PRN Callie Black, DO        enoxaparin (LOVENOX)
Consult called in to Pulmonary, 6-6-23 @837.  Angelo Age
Handoff report given to [facility]. Care transferred.
Patient educated on discharge instructions as well as new medications use, dosage, administration and possible side effects. Patient verified knowledge. IV removed without difficulty and dry dressing in place. Telemetry monitor removed and returned to [facility]. Pt left facility in stable condition to Home with all of their personal belongings.
Physician Progress Note      PATIENT:               Ash Muro  CSN #:                  [id number]  :                       1960  ADMIT DATE:       6/3/2023 1:53 PM  [address] [place] DATE:  Art David  PROVIDER #:        Luz Lizama MD          QUERY TEXT:    Patient admitted with CP. Noted documentation of acute respiratory failure in   cardiology PN on . Patient has been on room air with no respiratory   distress. In order to support the diagnosis of acute respiratory failure,   please include additional clinical indicators in your documentation. Or   please document if the diagnosis of acute respiratory failure has been ruled   out after further study. The medical record reflects the following:  Risk Factors: age, htn, smoker  Clinical Indicators: shortness of breath, dyspnea  Treatment:  chest, monitoring, room air    Acute Respiratory Failure Clinical Indicators per  MS-DRG Training Guide and   Quick Reference Guide:  pO2 < 60 mmHg or SpO2 (pulse oximetry) < 91% breathing room air  pCO2 > 50 and pH < 7.35  P/F ratio (pO2 / FIO2) < 300  pO2 decrease or pCO2 increase by 10 mmHg from baseline (if known)  Supplemental oxygen of 40% or more  Presence of respiratory distress, tachypnea, dyspnea, shortness of breath,   wheezing  Unable to speak in complete sentences  Use of accessory muscles to breathe  Extreme anxiety and feeling of impending doom  Tripod position  Confusion/altered mental status/obtunded    Thank you,  Kelsy Gan RN,BSN,CCDS,CRCR  Options provided:  -- Acute Respiratory Failure as evidenced by, Please document evidence.   -- Acute Respiratory Failure ruled out after study  -- Other - I will add my own diagnosis  -- Disagree - Not applicable / Not valid  -- Disagree - Clinically unable to determine / Unknown  -- Refer to Clinical Documentation Reviewer    PROVIDER RESPONSE TEXT:    This patient is in acute respiratory failure as evidenced by O2 saturation 88%   on 6.3.23 day before
Physician Progress Note      PATIENT:               Steven PRESCOTT #:                  [id number]  :                       1960  ADMIT DATE:       6/3/2023 1:53 PM  [address] [place] DATE:        2023 2:35 PM  RESPONDING  PROVIDER #:        Jerry Bray MD          QUERY TEXT:    Pt with documentation of acute respiratory failure per cardiology consultant. Patient was on room air. If possible, please document in progress notes and   discharge summary:    The medical record reflects the following:  Risk Factors: age, COPD, RB ILD, and bronchiectasis  Clinical Indicators: BANERJEE  Treatment: on room air    Thank you,  Kelsy Gan RN,BSN,CCDS,CRCR  Options provided:  -- Acute respiratory failure treated this admission  -- Acute respiratory failure ruled out  -- Other - I will add my own diagnosis  -- Disagree - Not applicable / Not valid  -- Disagree - Clinically unable to determine / Unknown  -- Refer to Clinical Documentation Reviewer    PROVIDER RESPONSE TEXT:    The diagnosis of acute respiratory failure was ruled out.     Query created by: Mc Paulino on 2023 8:00 AM      Electronically signed by:  Jerry Bray MD 2023 7:06 AM
Pt C/O 8/10 pain, PRN NORCO given at this time Asher Hines, RN
Pt C/o 8/10 pain, PRN NORCO given at this time Janice Pool RN
Pt in bed, awake, A/O X4. Shift assessment complete, night meds given. Pt C/O 7/10 pain, PRN tylenol given at this time  . No other needs expressed. Call light in reach. Will monitor.   Myriam Perez RN
RT Inhaler-Nebulizer Bronchodilator Protocol Note    There is a bronchodilator order in the chart from a provider indicating to follow the RT Bronchodilator Protocol and there is an Initiate RT Inhaler-Nebulizer Bronchodilator Protocol order as well (see protocol at bottom of note). CXR Findings:  No results found. The findings from the last RT Protocol Assessment were as follows:   History Pulmonary Disease: (P) Smoker 15 pack years or more  Respiratory Pattern: (P) Regular pattern and RR 12-20 bpm  Breath Sounds: (P) Intermittent or unilateral wheezes  Cough: (P) Strong, spontaneous, non-productive  Indication for Bronchodilator Therapy: (P) Decreased or absent breath sounds, Wheezing associated with pulm disorder  Bronchodilator Assessment Score: (P) 5    Aerosolized bronchodilator medication orders have been revised according to the RT Inhaler-Nebulizer Bronchodilator Protocol below. Respiratory Therapist to perform RT Therapy Protocol Assessment initially then follow the protocol. Repeat RT Therapy Protocol Assessment PRN for score 0-3 or on second treatment, BID, and PRN for scores above 3. No Indications - adjust the frequency to every 6 hours PRN wheezing or bronchospasm, if no treatments needed after 48 hours then discontinue using Per Protocol order mode. If indication present, adjust the RT bronchodilator orders based on the Bronchodilator Assessment Score as indicated below. Use Inhaler orders unless patient has one or more of the following: on home nebulizer, not able to hold breath for 10 seconds, is not alert and oriented, cannot activate and use MDI correctly, or respiratory rate 25 breaths per minute or more, then use the equivalent nebulizer order(s) with same Frequency and PRN reasons based on the score. If a patient is on this medication at home then do not decrease Frequency below that used at home.     0-3 - enter or revise RT bronchodilator order(s) to equivalent RT
RT Inhaler-Nebulizer Bronchodilator Protocol Note    There is a bronchodilator order in the chart from a provider indicating to follow the RT Bronchodilator Protocol and there is an Initiate RT Inhaler-Nebulizer Bronchodilator Protocol order as well (see protocol at bottom of note). CXR Findings:  No results found. The findings from the last RT Protocol Assessment were as follows:   History Pulmonary Disease: (P) Smoker 15 pack years or more  Respiratory Pattern: (P) Regular pattern and RR 12-20 bpm  Breath Sounds: (P) Slightly diminished and/or crackles  Cough: (P) Strong, spontaneous, non-productive  Indication for Bronchodilator Therapy: (P) Decreased or absent breath sounds  Bronchodilator Assessment Score: (P) 3    Aerosolized bronchodilator medication orders have been revised according to the RT Inhaler-Nebulizer Bronchodilator Protocol below. Respiratory Therapist to perform RT Therapy Protocol Assessment initially then follow the protocol. Repeat RT Therapy Protocol Assessment PRN for score 0-3 or on second treatment, BID, and PRN for scores above 3. No Indications - adjust the frequency to every 6 hours PRN wheezing or bronchospasm, if no treatments needed after 48 hours then discontinue using Per Protocol order mode. If indication present, adjust the RT bronchodilator orders based on the Bronchodilator Assessment Score as indicated below. Use Inhaler orders unless patient has one or more of the following: on home nebulizer, not able to hold breath for 10 seconds, is not alert and oriented, cannot activate and use MDI correctly, or respiratory rate 25 breaths per minute or more, then use the equivalent nebulizer order(s) with same Frequency and PRN reasons based on the score. If a patient is on this medication at home then do not decrease Frequency below that used at home.     0-3 - enter or revise RT bronchodilator order(s) to equivalent RT Bronchodilator order with Frequency of every 4
RT Inhaler-Nebulizer Bronchodilator Protocol Note    There is a bronchodilator order in the chart from a provider indicating to follow the RT Bronchodilator Protocol and there is an Initiate RT Inhaler-Nebulizer Bronchodilator Protocol order as well (see protocol at bottom of note). CXR Findings:  No results found. The findings from the last RT Protocol Assessment were as follows:   History Pulmonary Disease: (P) Smoker 15 pack years or more  Respiratory Pattern: (P) Regular pattern and RR 12-20 bpm  Breath Sounds: (P) Slightly diminished and/or crackles  Cough: (P) Strong, spontaneous, non-productive  Indication for Bronchodilator Therapy: (P) Decreased or absent breath sounds  Bronchodilator Assessment Score: (P) 3    Aerosolized bronchodilator medication orders have been revised according to the RT Inhaler-Nebulizer Bronchodilator Protocol below. Respiratory Therapist to perform RT Therapy Protocol Assessment initially then follow the protocol. Repeat RT Therapy Protocol Assessment PRN for score 0-3 or on second treatment, BID, and PRN for scores above 3. No Indications - adjust the frequency to every 6 hours PRN wheezing or bronchospasm, if no treatments needed after 48 hours then discontinue using Per Protocol order mode. If indication present, adjust the RT bronchodilator orders based on the Bronchodilator Assessment Score as indicated below. Use Inhaler orders unless patient has one or more of the following: on home nebulizer, not able to hold breath for 10 seconds, is not alert and oriented, cannot activate and use MDI correctly, or respiratory rate 25 breaths per minute or more, then use the equivalent nebulizer order(s) with same Frequency and PRN reasons based on the score. If a patient is on this medication at home then do not decrease Frequency below that used at home.     0-3 - enter or revise RT bronchodilator order(s) to equivalent RT Bronchodilator order with Frequency of every 4
Callie Black, DO        enoxaparin (LOVENOX) injection 40 mg  40 mg SubCUTAneous Daily Callie Black, DO   40 mg at 06/04/23 0919    ondansetron (ZOFRAN-ODT) disintegrating tablet 4 mg  4 mg Oral Q8H PRN Callie Black, DO        Or    ondansetron (ZOFRAN) injection 4 mg  4 mg IntraVENous Q6H PRN Callie Black, DO        polyethylene glycol (GLYCOLAX) packet 17 g  17 g Oral Daily PRN Callie Black, DO        acetaminophen (TYLENOL) tablet 650 mg  650 mg Oral Q6H PRN Callie Black, DO        Or    acetaminophen (TYLENOL) suppository 650 mg  650 mg Rectal Q6H PRN Callie Black, DO        perflutren lipid microspheres (DEFINITY) injection 1.5 mL  1.5 mL IntraVENous ONCE PRN Callie Black, DO        HYDROcodone-acetaminophen (NORCO) 5-325 MG per tablet 1 tablet  1 tablet Oral Q6H PRN Callie Black, DO   1 tablet at 06/05/23 1108    pregabalin (LYRICA) capsule 100 mg  100 mg Oral BID Callie Black, DO   100 mg at 06/05/23 0901    aspirin chewable tablet 81 mg  81 mg Oral Daily Callie Black, DO   81 mg at 06/04/23 0919    atorvastatin (LIPITOR) tablet 40 mg  40 mg Oral Nightly Callie Black, DO   40 mg at 06/04/23 2123     Review of Systems   Constitutional: Negative. Respiratory:  Positive for shortness of breath. Cardiovascular: Negative. Gastrointestinal: Negative. Neurological: Negative. Objective:     Telemetry monitor: SR    Physical Exam:  Constitutional:  Comfortable and alert, NAD, appears stated age  Eyes: PERRL, sclera nonicteric  Neck:  Supple, no masses, no thyroidmegaly, no JVD  Skin:  Warm and dry; no rash or lesions  Heart:  Regular, normal apex, S1 and S2 normal, no M/G/R  Lungs:  Normal respiratory effort; bibasilar crackles  Abdomen: soft, non tender, + bowel sounds  Extremities:  No edema or cyanosis; no clubbing  Neuro: alert and oriented, moves legs and arms equally, normal mood and affect    Data Reviewed:    Echo 6/2023:  Left ventricular systolic function is normal with ejection fraction   estimated at 55%.    No
crackles on exam. ILD in the DD  Atypical chest pain -stress test today. No chest pain at present  EKG no acute ischemia, ?inferior T wave changes will repeat the EKG  Cardiology consulted  Lipid panel  ASA, statin  Lasix and Duoneb  Tele  Echo, poss stress test today. ?HRCT     HTN stable  Anxiety  Nicotine dependence     DVT Prophylaxis: Lovenox  Diet:reg.  NPO after midnight  Code Status: Prior     PT/OT Eval Status: not ordered      Dispo - Inpatient, PCU      Piotr Wilde MD, MD 6/5/2023 7:31 AM

## 2023-12-05 RX ORDER — FLUTICASONE PROPIONATE AND SALMETEROL 50; 250 UG/1; UG/1
POWDER RESPIRATORY (INHALATION)
Qty: 60 EACH | Refills: 4 | OUTPATIENT
Start: 2023-12-05

## 2024-02-14 RX ORDER — FLUTICASONE PROPIONATE AND SALMETEROL 50; 250 UG/1; UG/1
POWDER RESPIRATORY (INHALATION)
Qty: 60 EACH | Refills: 4 | OUTPATIENT
Start: 2024-02-14

## 2024-03-11 RX ORDER — FLUTICASONE PROPIONATE AND SALMETEROL 50; 250 UG/1; UG/1
POWDER RESPIRATORY (INHALATION)
Qty: 60 EACH | Refills: 4 | OUTPATIENT
Start: 2024-03-11

## 2024-04-12 RX ORDER — FLUTICASONE PROPIONATE AND SALMETEROL 50; 250 UG/1; UG/1
POWDER RESPIRATORY (INHALATION)
Qty: 60 EACH | Refills: 4 | OUTPATIENT
Start: 2024-04-12

## 2024-05-15 RX ORDER — FLUTICASONE PROPIONATE AND SALMETEROL 50; 250 UG/1; UG/1
POWDER RESPIRATORY (INHALATION)
Qty: 60 EACH | Refills: 4 | OUTPATIENT
Start: 2024-05-15

## 2024-06-05 RX ORDER — FLUTICASONE PROPIONATE AND SALMETEROL 50; 250 UG/1; UG/1
POWDER RESPIRATORY (INHALATION)
Qty: 60 EACH | Refills: 4 | OUTPATIENT
Start: 2024-06-05

## 2024-09-30 RX ORDER — FLUTICASONE PROPIONATE AND SALMETEROL 50; 250 UG/1; UG/1
POWDER RESPIRATORY (INHALATION)
Qty: 60 EACH | Refills: 4 | OUTPATIENT
Start: 2024-09-30